# Patient Record
Sex: MALE
[De-identification: names, ages, dates, MRNs, and addresses within clinical notes are randomized per-mention and may not be internally consistent; named-entity substitution may affect disease eponyms.]

---
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
## 2020-11-03 ENCOUNTER — TELEPHONE (OUTPATIENT)
Dept: FAMILY MEDICINE CLINIC | Facility: CLINIC | Age: 20
End: 2020-11-03

## 2020-11-03 NOTE — TELEPHONE ENCOUNTER
Patient requesting covid testing due to school requires it since he had cold symptoms recently (runny nose, congestion). Cold symptoms have resolved and he has no symptoms of illness. Scheduled patient for phone visit on 11/4/20 with Yarely Garay.     Megan Mcguire

## 2020-11-03 NOTE — TELEPHONE ENCOUNTER
Patient states that his employer is requiring a covid test, wants to know where he could get it done. Would like a call back.

## 2020-11-04 ENCOUNTER — VIRTUAL PHONE E/M (OUTPATIENT)
Dept: FAMILY MEDICINE CLINIC | Facility: CLINIC | Age: 20
End: 2020-11-04
Payer: COMMERCIAL

## 2020-11-04 VITALS — TEMPERATURE: 99 F | WEIGHT: 182 LBS | BODY MASS INDEX: 27 KG/M2

## 2020-11-04 DIAGNOSIS — J06.9 UPPER RESPIRATORY TRACT INFECTION, UNSPECIFIED TYPE: Primary | ICD-10-CM

## 2020-11-04 PROCEDURE — 99212 OFFICE O/P EST SF 10 MIN: CPT | Performed by: NURSE PRACTITIONER

## 2020-11-04 NOTE — PROGRESS NOTES
HPI:    Patient ID: Avinash Thorpe is a 21year old male. Avinash Thorpe  verbally consents to a Virtual/Telephone Check-In service on 11/4/2020.     Patient understands and accepts financial responsibility for any deductible, co-insurance and/or co-pa Testing by PCR () [E]    Patient was assessed today via phone visit. Plan is for patient to proceed with Covid testing. Advised to continue to monitor symptoms.   Advised patient to take Tylenol as needed if he were to experience body aches, chills,

## 2020-11-06 ENCOUNTER — TELEPHONE (OUTPATIENT)
Dept: FAMILY MEDICINE CLINIC | Facility: CLINIC | Age: 20
End: 2020-11-06

## 2020-11-06 NOTE — TELEPHONE ENCOUNTER
Fax received from [facility] with Covid results from 11/4/2020. Results came back Negative. Please advise.